# Patient Record
Sex: FEMALE | Race: WHITE | NOT HISPANIC OR LATINO | URBAN - METROPOLITAN AREA
[De-identification: names, ages, dates, MRNs, and addresses within clinical notes are randomized per-mention and may not be internally consistent; named-entity substitution may affect disease eponyms.]

---

## 2023-08-21 ENCOUNTER — HOSPITAL ENCOUNTER (OUTPATIENT)
Dept: DATA CONVERSION | Facility: HOSPITAL | Age: 63
End: 2023-08-21

## 2023-08-21 DIAGNOSIS — Z12.31 ENCOUNTER FOR SCREENING MAMMOGRAM FOR MALIGNANT NEOPLASM OF BREAST: ICD-10-CM

## 2023-09-11 PROBLEM — G47.00 INSOMNIA: Status: ACTIVE | Noted: 2023-09-11

## 2023-09-11 PROBLEM — M81.0 POST-MENOPAUSAL OSTEOPOROSIS: Status: ACTIVE | Noted: 2023-09-11

## 2023-09-11 PROBLEM — E55.9 VITAMIN D DEFICIENCY: Status: ACTIVE | Noted: 2023-09-11

## 2023-09-11 PROBLEM — R92.8 ABNORMAL MAMMOGRAM: Status: ACTIVE | Noted: 2023-09-11

## 2023-09-11 RX ORDER — ASCORBIC ACID 500 MG
TABLET ORAL
COMMUNITY

## 2023-12-21 ENCOUNTER — ANCILLARY PROCEDURE (OUTPATIENT)
Dept: RADIOLOGY | Facility: CLINIC | Age: 63
End: 2023-12-21
Payer: COMMERCIAL

## 2023-12-21 VITALS — WEIGHT: 170 LBS | HEIGHT: 66 IN | BODY MASS INDEX: 27.32 KG/M2

## 2023-12-21 DIAGNOSIS — Z12.31 ENCOUNTER FOR SCREENING MAMMOGRAM FOR MALIGNANT NEOPLASM OF BREAST: ICD-10-CM

## 2023-12-21 PROCEDURE — 77067 SCR MAMMO BI INCL CAD: CPT

## 2024-05-31 ENCOUNTER — APPOINTMENT (OUTPATIENT)
Dept: PRIMARY CARE | Facility: CLINIC | Age: 64
End: 2024-05-31
Payer: COMMERCIAL

## 2024-06-05 ENCOUNTER — OFFICE VISIT (OUTPATIENT)
Dept: PRIMARY CARE | Facility: CLINIC | Age: 64
End: 2024-06-05
Payer: COMMERCIAL

## 2024-06-05 ENCOUNTER — LAB (OUTPATIENT)
Dept: LAB | Facility: LAB | Age: 64
End: 2024-06-05
Payer: COMMERCIAL

## 2024-06-05 VITALS
BODY MASS INDEX: 27.6 KG/M2 | DIASTOLIC BLOOD PRESSURE: 64 MMHG | WEIGHT: 171 LBS | HEART RATE: 65 BPM | OXYGEN SATURATION: 99 % | TEMPERATURE: 97.5 F | SYSTOLIC BLOOD PRESSURE: 106 MMHG

## 2024-06-05 DIAGNOSIS — E55.9 VITAMIN D DEFICIENCY: ICD-10-CM

## 2024-06-05 DIAGNOSIS — Z13.228 SCREENING FOR METABOLIC DISORDER: ICD-10-CM

## 2024-06-05 DIAGNOSIS — D17.1 LIPOMA OF BACK: ICD-10-CM

## 2024-06-05 DIAGNOSIS — E87.5 HYPERKALEMIA: ICD-10-CM

## 2024-06-05 DIAGNOSIS — M81.0 POST-MENOPAUSAL OSTEOPOROSIS: ICD-10-CM

## 2024-06-05 DIAGNOSIS — Z13.6 SCREENING FOR CARDIOVASCULAR CONDITION: ICD-10-CM

## 2024-06-05 DIAGNOSIS — Z00.00 ANNUAL PHYSICAL EXAM: Primary | ICD-10-CM

## 2024-06-05 DIAGNOSIS — Z00.00 ANNUAL PHYSICAL EXAM: ICD-10-CM

## 2024-06-05 LAB
25(OH)D3 SERPL-MCNC: 34 NG/ML (ref 31–100)
ALBUMIN SERPL-MCNC: 4.4 G/DL (ref 3.5–5)
ALP BLD-CCNC: 97 U/L (ref 35–125)
ALT SERPL-CCNC: 10 U/L (ref 5–40)
ANION GAP SERPL CALC-SCNC: 12 MMOL/L
AST SERPL-CCNC: 16 U/L (ref 5–40)
BASOPHILS # BLD AUTO: 0.06 X10*3/UL (ref 0–0.1)
BASOPHILS NFR BLD AUTO: 1 %
BILIRUB SERPL-MCNC: 0.4 MG/DL (ref 0.1–1.2)
BUN SERPL-MCNC: 15 MG/DL (ref 8–25)
CALCIUM SERPL-MCNC: 9.9 MG/DL (ref 8.5–10.4)
CHLORIDE SERPL-SCNC: 107 MMOL/L (ref 97–107)
CHOLEST SERPL-MCNC: 207 MG/DL (ref 133–200)
CHOLEST/HDLC SERPL: 3.1 {RATIO}
CO2 SERPL-SCNC: 24 MMOL/L (ref 24–31)
CREAT SERPL-MCNC: 0.7 MG/DL (ref 0.4–1.6)
EGFRCR SERPLBLD CKD-EPI 2021: >90 ML/MIN/1.73M*2
EOSINOPHIL # BLD AUTO: 0.08 X10*3/UL (ref 0–0.7)
EOSINOPHIL NFR BLD AUTO: 1.4 %
ERYTHROCYTE [DISTWIDTH] IN BLOOD BY AUTOMATED COUNT: 14.5 % (ref 11.5–14.5)
GLUCOSE SERPL-MCNC: 84 MG/DL (ref 65–99)
HCT VFR BLD AUTO: 40.2 % (ref 36–46)
HDLC SERPL-MCNC: 67 MG/DL
HGB BLD-MCNC: 12.2 G/DL (ref 12–16)
IMM GRANULOCYTES # BLD AUTO: 0.04 X10*3/UL (ref 0–0.7)
IMM GRANULOCYTES NFR BLD AUTO: 0.7 % (ref 0–0.9)
LDLC SERPL CALC-MCNC: 129 MG/DL (ref 65–130)
LYMPHOCYTES # BLD AUTO: 1.9 X10*3/UL (ref 1.2–4.8)
LYMPHOCYTES NFR BLD AUTO: 32.5 %
MCH RBC QN AUTO: 27.1 PG (ref 26–34)
MCHC RBC AUTO-ENTMCNC: 30.3 G/DL (ref 32–36)
MCV RBC AUTO: 89 FL (ref 80–100)
MONOCYTES # BLD AUTO: 0.44 X10*3/UL (ref 0.1–1)
MONOCYTES NFR BLD AUTO: 7.5 %
NEUTROPHILS # BLD AUTO: 3.32 X10*3/UL (ref 1.2–7.7)
NEUTROPHILS NFR BLD AUTO: 56.9 %
NRBC BLD-RTO: 0 /100 WBCS (ref 0–0)
PLATELET # BLD AUTO: 405 X10*3/UL (ref 150–450)
POTASSIUM SERPL-SCNC: 5.2 MMOL/L (ref 3.4–5.1)
PROT SERPL-MCNC: 7.2 G/DL (ref 5.9–7.9)
RBC # BLD AUTO: 4.51 X10*6/UL (ref 4–5.2)
SODIUM SERPL-SCNC: 143 MMOL/L (ref 133–145)
TRIGL SERPL-MCNC: 55 MG/DL (ref 40–150)
TSH SERPL DL<=0.05 MIU/L-ACNC: 1.11 MIU/L (ref 0.27–4.2)
WBC # BLD AUTO: 5.8 X10*3/UL (ref 4.4–11.3)

## 2024-06-05 PROCEDURE — 85025 COMPLETE CBC W/AUTO DIFF WBC: CPT

## 2024-06-05 PROCEDURE — 82306 VITAMIN D 25 HYDROXY: CPT

## 2024-06-05 PROCEDURE — 1036F TOBACCO NON-USER: CPT | Performed by: INTERNAL MEDICINE

## 2024-06-05 PROCEDURE — 84443 ASSAY THYROID STIM HORMONE: CPT

## 2024-06-05 PROCEDURE — 99396 PREV VISIT EST AGE 40-64: CPT | Performed by: INTERNAL MEDICINE

## 2024-06-05 PROCEDURE — 36415 COLL VENOUS BLD VENIPUNCTURE: CPT

## 2024-06-05 PROCEDURE — 80053 COMPREHEN METABOLIC PANEL: CPT

## 2024-06-05 PROCEDURE — 80061 LIPID PANEL: CPT

## 2024-06-05 ASSESSMENT — ENCOUNTER SYMPTOMS
VOMITING: 0
CHILLS: 0
BACK PAIN: 0
WHEEZING: 0
NAUSEA: 0
UNEXPECTED WEIGHT CHANGE: 0
FREQUENCY: 0
ABDOMINAL PAIN: 0
TREMORS: 0
SINUS PRESSURE: 0
POLYPHAGIA: 0
COUGH: 0
NUMBNESS: 0
LOSS OF SENSATION IN FEET: 0
PALPITATIONS: 0
FATIGUE: 0
POLYDIPSIA: 0
DEPRESSION: 0
MYALGIAS: 0
TROUBLE SWALLOWING: 0
BLOOD IN STOOL: 0
OCCASIONAL FEELINGS OF UNSTEADINESS: 0
DYSURIA: 0
SHORTNESS OF BREATH: 0
SEIZURES: 0

## 2024-06-05 ASSESSMENT — PATIENT HEALTH QUESTIONNAIRE - PHQ9
2. FEELING DOWN, DEPRESSED OR HOPELESS: NOT AT ALL
1. LITTLE INTEREST OR PLEASURE IN DOING THINGS: NOT AT ALL
SUM OF ALL RESPONSES TO PHQ9 QUESTIONS 1 AND 2: 0

## 2024-06-05 ASSESSMENT — PAIN SCALES - GENERAL: PAINLEVEL: 0-NO PAIN

## 2024-06-05 NOTE — PROGRESS NOTES
Subjective   Patient ID: Bradley Mireles is a 64 y.o. female who presents for Annual Exam.    HPI     Exercises 2-3 times a week          Lipoma on back stable         Refused shingles vaccine.  No complaints today    Review of Systems   Constitutional:  Negative for chills, fatigue and unexpected weight change.   HENT:  Negative for postnasal drip, sinus pressure and trouble swallowing.    Respiratory:  Negative for cough, shortness of breath and wheezing.    Cardiovascular:  Negative for chest pain, palpitations and leg swelling.   Gastrointestinal:  Negative for abdominal pain, blood in stool, nausea and vomiting.   Endocrine: Negative for polydipsia, polyphagia and polyuria.   Genitourinary:  Negative for dysuria and frequency.   Musculoskeletal:  Negative for back pain and myalgias.   Skin:  Negative for rash.   Neurological:  Negative for tremors, seizures and numbness.   Psychiatric/Behavioral:  Negative for behavioral problems.        Objective   /64 (BP Location: Left arm, Patient Position: Sitting, BP Cuff Size: Adult)   Pulse 65   Temp 36.4 °C (97.5 °F) (Temporal)   Wt 77.6 kg (171 lb)   SpO2 99%   BMI 27.60 kg/m²     Physical Exam  Constitutional:       General: She is not in acute distress.     Appearance: Normal appearance.   HENT:      Head: Normocephalic and atraumatic.      Right Ear: Tympanic membrane normal.      Left Ear: Tympanic membrane normal.   Eyes:      Extraocular Movements: Extraocular movements intact.      Pupils: Pupils are equal, round, and reactive to light.   Cardiovascular:      Rate and Rhythm: Normal rate and regular rhythm.      Heart sounds: Normal heart sounds. No murmur heard.     No friction rub.   Pulmonary:      Effort: Pulmonary effort is normal.      Breath sounds: Normal breath sounds. No wheezing or rales.   Abdominal:      General: Bowel sounds are normal.      Palpations: Abdomen is soft.      Tenderness: There is no abdominal tenderness. There is no  guarding.   Musculoskeletal:         General: Normal range of motion.      Cervical back: Normal range of motion and neck supple.      Right lower leg: No edema.      Left lower leg: No edema.   Skin:     General: Skin is warm and dry.      Findings: No rash.      Comments: Upper mid back soft swelling, likely lipoma approximately 6 to 8 cm   Neurological:      General: No focal deficit present.      Mental Status: She is alert and oriented to person, place, and time.      Cranial Nerves: No cranial nerve deficit.      Gait: Gait normal.   Psychiatric:         Mood and Affect: Mood normal.         Assessment/Plan        Bradley was seen today for annual exam.  Diagnoses and all orders for this visit:  Annual physical exam (Primary)  -     CBC and Auto Differential; Future  -     Comprehensive Metabolic Panel; Future  Vitamin D deficiency  -     Vitamin D 25-Hydroxy,Total (for eval of Vitamin D levels); Future  Screening for cardiovascular condition  -     Lipid Panel; Future  Screening for metabolic disorder  -     TSH with reflex to Free T4 if abnormal; Future  Post-menopausal osteoporosis  -     XR DEXA bone density; Future  Lipoma of back  -     Referral to General Surgery; Future       Current Outpatient Medications   Medication Instructions    ascorbic acid (Vitamin C) 500 mg tablet oral    calcium carbonate (CALCIUM 600 ORAL) oral    ergocalciferol, vitamin D2, (VITAMIN D2 ORAL) oral

## 2024-07-24 ENCOUNTER — LAB (OUTPATIENT)
Dept: LAB | Facility: LAB | Age: 64
End: 2024-07-24
Payer: COMMERCIAL

## 2024-07-24 DIAGNOSIS — E87.5 HYPERKALEMIA: ICD-10-CM

## 2024-07-24 LAB
ANION GAP SERPL CALC-SCNC: 11 MMOL/L
BUN SERPL-MCNC: 15 MG/DL (ref 8–25)
CALCIUM SERPL-MCNC: 10 MG/DL (ref 8.5–10.4)
CHLORIDE SERPL-SCNC: 109 MMOL/L (ref 97–107)
CO2 SERPL-SCNC: 24 MMOL/L (ref 24–31)
CREAT SERPL-MCNC: 0.7 MG/DL (ref 0.4–1.6)
EGFRCR SERPLBLD CKD-EPI 2021: >90 ML/MIN/1.73M*2
GLUCOSE SERPL-MCNC: 90 MG/DL (ref 65–99)
POTASSIUM SERPL-SCNC: 4.7 MMOL/L (ref 3.4–5.1)
SODIUM SERPL-SCNC: 144 MMOL/L (ref 133–145)

## 2024-07-24 PROCEDURE — 36415 COLL VENOUS BLD VENIPUNCTURE: CPT

## 2024-07-24 PROCEDURE — 80048 BASIC METABOLIC PNL TOTAL CA: CPT

## 2024-08-17 ASSESSMENT — ENCOUNTER SYMPTOMS
ADENOPATHY: 0
JOINT SWELLING: 0
ACTIVITY CHANGE: 0
DYSURIA: 0
ABDOMINAL PAIN: 0
SHORTNESS OF BREATH: 0
COLOR CHANGE: 0
DIFFICULTY URINATING: 0
HEADACHES: 0
UNEXPECTED WEIGHT CHANGE: 0
DIZZINESS: 0
WEAKNESS: 0
CHEST TIGHTNESS: 0
ABDOMINAL DISTENTION: 0
FATIGUE: 0

## 2024-08-17 NOTE — PROGRESS NOTES
Annual-menopause  Subjective   Bradley Mireles is a 64 y.o. former pt Dr. Raphael/last exam 2023 wc, who is here for a routine exam.   Complaints: fewer  vaso sxs, milder and less intense; bothered by abdominal adiposity/wt gain since menopause. Denies urogyn sxs.           Denies vag bleed or dsicharge; denies pelvic pain, pressure, or bloating  PMHx: dense breast tissue; Osteopenia hip.  Surg Hx: cone biopsy         mole removed R shoulder - abn cells         COLONOSCOPY- NEG 2018; rpt due   Father:  80 yrs, htn, smoker/COPD  Mother: alive 76 yrs, Lyme disease, MI, pace maker  Last pap 2021 NEG/HPV NEG; Mamm 2023-benign ; Abn Mamm 02/15/2016- LT f/u cyst.   DEXA  2020 =spine 0.0/hip-1.8.   Colposcopy 2008 BRAD 3/CIS--resolved by cone bx.   Last Period 2016/age 56. STDs HSV ; not currently sexually active   Total preg 4.  live births 2= VAVDs.    Review of Systems   Constitutional:  Negative for activity change, fatigue and unexpected weight change.   Respiratory:  Negative for chest tightness and shortness of breath.    Cardiovascular:  Negative for chest pain and leg swelling.   Gastrointestinal:  Negative for abdominal distention and abdominal pain.   Genitourinary:  Negative for difficulty urinating, dysuria, genital sores, pelvic pain, vaginal bleeding, vaginal discharge and vaginal pain.   Musculoskeletal:  Negative for gait problem and joint swelling.   Skin:  Negative for color change and rash.   Neurological:  Negative for dizziness, weakness and headaches.   Hematological:  Negative for adenopathy.   Objective Visit Vitals  /84   Wt 77.7 kg (171 lb 6.4 oz)   BMI 27.66 kg/m²   OB Status Postmenopausal   Smoking Status Never   BSA 1.9 m²       General:   Alert and oriented, in no acute distress   Neck: Supple. No visible thyromegaly.    Breast/Axilla: Normal to palpation bilaterally without masses, skin changes, or nipple discharge.    Abdomen: Soft,  non-tender, without masses or organomegaly   Vulva: Normal architecture without erythema, masses, or lesions.    Vagina: mucosa without lesions, masses.  Positive atrophy. No abnormal vaginal discharge.    Cervix: Normal without masses, lesions, or signs of cervicitis.    Uterus: Normal mobile, non-enlarged uterus    Adnexa: No palpable masses or tenderness   Pelvic Floor No POP noted. No high tone pelvic floor    Psych  Rectal Normal affect. Normal mood.      Assessment/Plan   Encounter Diagnoses   Name Primary?    Visit for gynecologic examination; grossly nl breast/gyn exams. Yes    Screening for human papillomavirus; pap sent     History of cervical dysplasia;needs surveillance til 20 yrs past tx CIS=2028.     Encounter for screening mammogram for malignant neoplasm of breast; due 12/2024     Menopause; sxs abating.     Postmenopausal atrophic vaginitis; minimal sxs.     Postmenopausal bone loss; dexa due; has order from pcp.     Screen for colon cancer; 2018 neg; rpt due 2028.      Lexi Urbina MD

## 2024-08-19 ENCOUNTER — OFFICE VISIT (OUTPATIENT)
Dept: OBSTETRICS AND GYNECOLOGY | Facility: CLINIC | Age: 64
End: 2024-08-19
Payer: COMMERCIAL

## 2024-08-19 VITALS — BODY MASS INDEX: 27.66 KG/M2 | WEIGHT: 171.4 LBS | DIASTOLIC BLOOD PRESSURE: 84 MMHG | SYSTOLIC BLOOD PRESSURE: 138 MMHG

## 2024-08-19 DIAGNOSIS — Z12.31 ENCOUNTER FOR SCREENING MAMMOGRAM FOR MALIGNANT NEOPLASM OF BREAST: ICD-10-CM

## 2024-08-19 DIAGNOSIS — Z87.410 HISTORY OF CERVICAL DYSPLASIA: ICD-10-CM

## 2024-08-19 DIAGNOSIS — Z11.51 SCREENING FOR HUMAN PAPILLOMAVIRUS: ICD-10-CM

## 2024-08-19 DIAGNOSIS — M81.0 POSTMENOPAUSAL BONE LOSS: ICD-10-CM

## 2024-08-19 DIAGNOSIS — Z78.0 MENOPAUSE: ICD-10-CM

## 2024-08-19 DIAGNOSIS — N95.2 POSTMENOPAUSAL ATROPHIC VAGINITIS: ICD-10-CM

## 2024-08-19 DIAGNOSIS — Z12.11 SCREEN FOR COLON CANCER: ICD-10-CM

## 2024-08-19 DIAGNOSIS — Z01.419 VISIT FOR GYNECOLOGIC EXAMINATION: Primary | ICD-10-CM

## 2024-08-19 PROCEDURE — 99396 PREV VISIT EST AGE 40-64: CPT | Performed by: OBSTETRICS & GYNECOLOGY

## 2024-08-19 ASSESSMENT — ENCOUNTER SYMPTOMS
DEPRESSION: 0
LOSS OF SENSATION IN FEET: 0
OCCASIONAL FEELINGS OF UNSTEADINESS: 0

## 2024-08-19 ASSESSMENT — PATIENT HEALTH QUESTIONNAIRE - PHQ9
SUM OF ALL RESPONSES TO PHQ9 QUESTIONS 1 & 2: 0
2. FEELING DOWN, DEPRESSED OR HOPELESS: NOT AT ALL
1. LITTLE INTEREST OR PLEASURE IN DOING THINGS: NOT AT ALL

## 2024-08-19 ASSESSMENT — PAIN SCALES - GENERAL: PAINLEVEL: 0-NO PAIN

## 2024-12-31 ENCOUNTER — HOSPITAL ENCOUNTER (OUTPATIENT)
Dept: RADIOLOGY | Facility: CLINIC | Age: 64
Discharge: HOME | End: 2024-12-31
Payer: COMMERCIAL

## 2024-12-31 VITALS — HEIGHT: 66 IN | WEIGHT: 170 LBS | BODY MASS INDEX: 27.32 KG/M2

## 2024-12-31 DIAGNOSIS — Z12.31 ENCOUNTER FOR SCREENING MAMMOGRAM FOR MALIGNANT NEOPLASM OF BREAST: ICD-10-CM

## 2024-12-31 DIAGNOSIS — M81.0 POST-MENOPAUSAL OSTEOPOROSIS: ICD-10-CM

## 2024-12-31 PROCEDURE — 77063 BREAST TOMOSYNTHESIS BI: CPT | Performed by: RADIOLOGY

## 2024-12-31 PROCEDURE — 77067 SCR MAMMO BI INCL CAD: CPT | Performed by: RADIOLOGY

## 2024-12-31 PROCEDURE — 77067 SCR MAMMO BI INCL CAD: CPT

## 2025-01-08 ENCOUNTER — HOSPITAL ENCOUNTER (OUTPATIENT)
Dept: RADIOLOGY | Facility: CLINIC | Age: 65
Discharge: HOME | End: 2025-01-08
Payer: COMMERCIAL

## 2025-01-08 PROCEDURE — 77080 DXA BONE DENSITY AXIAL: CPT

## 2025-01-08 PROCEDURE — 77080 DXA BONE DENSITY AXIAL: CPT | Performed by: RADIOLOGY

## 2025-06-10 ENCOUNTER — HOSPITAL ENCOUNTER (OUTPATIENT)
Dept: RADIOLOGY | Facility: CLINIC | Age: 65
Discharge: HOME | End: 2025-06-10
Payer: COMMERCIAL

## 2025-06-10 ENCOUNTER — OFFICE VISIT (OUTPATIENT)
Dept: PRIMARY CARE | Facility: CLINIC | Age: 65
End: 2025-06-10
Payer: COMMERCIAL

## 2025-06-10 VITALS
OXYGEN SATURATION: 98 % | BODY MASS INDEX: 27.76 KG/M2 | HEART RATE: 62 BPM | SYSTOLIC BLOOD PRESSURE: 108 MMHG | DIASTOLIC BLOOD PRESSURE: 68 MMHG | TEMPERATURE: 96.6 F | WEIGHT: 172 LBS

## 2025-06-10 DIAGNOSIS — Z13.228 SCREENING FOR METABOLIC DISORDER: ICD-10-CM

## 2025-06-10 DIAGNOSIS — Z00.00 ANNUAL PHYSICAL EXAM: Primary | ICD-10-CM

## 2025-06-10 DIAGNOSIS — E04.1 THYROID NODULE: ICD-10-CM

## 2025-06-10 DIAGNOSIS — Z13.6 SCREENING FOR CARDIOVASCULAR CONDITION: ICD-10-CM

## 2025-06-10 DIAGNOSIS — E55.9 VITAMIN D DEFICIENCY: ICD-10-CM

## 2025-06-10 PROCEDURE — 99213 OFFICE O/P EST LOW 20 MIN: CPT | Performed by: INTERNAL MEDICINE

## 2025-06-10 PROCEDURE — 90750 HZV VACC RECOMBINANT IM: CPT | Performed by: INTERNAL MEDICINE

## 2025-06-10 PROCEDURE — 76536 US EXAM OF HEAD AND NECK: CPT | Performed by: STUDENT IN AN ORGANIZED HEALTH CARE EDUCATION/TRAINING PROGRAM

## 2025-06-10 PROCEDURE — 90471 IMMUNIZATION ADMIN: CPT | Performed by: INTERNAL MEDICINE

## 2025-06-10 PROCEDURE — 1036F TOBACCO NON-USER: CPT | Performed by: INTERNAL MEDICINE

## 2025-06-10 PROCEDURE — 99396 PREV VISIT EST AGE 40-64: CPT | Performed by: INTERNAL MEDICINE

## 2025-06-10 PROCEDURE — 76536 US EXAM OF HEAD AND NECK: CPT

## 2025-06-10 ASSESSMENT — ENCOUNTER SYMPTOMS
FREQUENCY: 0
POLYDIPSIA: 0
UNEXPECTED WEIGHT CHANGE: 0
OCCASIONAL FEELINGS OF UNSTEADINESS: 0
DEPRESSION: 0
ABDOMINAL PAIN: 0
SHORTNESS OF BREATH: 0
FATIGUE: 0
PALPITATIONS: 0
SEIZURES: 0
TREMORS: 0
CHILLS: 0
LOSS OF SENSATION IN FEET: 0
COUGH: 0
SINUS PRESSURE: 0
WHEEZING: 0
MYALGIAS: 0
NAUSEA: 0
TROUBLE SWALLOWING: 0
DYSURIA: 0
NUMBNESS: 0
POLYPHAGIA: 0
BLOOD IN STOOL: 0
VOMITING: 0
BACK PAIN: 0

## 2025-06-10 ASSESSMENT — PAIN SCALES - GENERAL: PAINLEVEL_OUTOF10: 3

## 2025-06-10 ASSESSMENT — PATIENT HEALTH QUESTIONNAIRE - PHQ9
SUM OF ALL RESPONSES TO PHQ9 QUESTIONS 1 AND 2: 0
1. LITTLE INTEREST OR PLEASURE IN DOING THINGS: NOT AT ALL
2. FEELING DOWN, DEPRESSED OR HOPELESS: NOT AT ALL

## 2025-06-10 NOTE — PROGRESS NOTES
Subjective   Patient ID: Bradley Mireles is a 65 y.o. female who presents for Annual Exam.    HPI     Exercises 2-3 times a week          Lipoma on back stable         Right foot plantar fascitis, using boot at night and strectches, refuswed referral to podiatry    Left sided thyroid nodule    Review of Systems   Constitutional:  Negative for chills, fatigue and unexpected weight change.   HENT:  Negative for postnasal drip, sinus pressure and trouble swallowing.    Respiratory:  Negative for cough, shortness of breath and wheezing.    Cardiovascular:  Negative for chest pain, palpitations and leg swelling.   Gastrointestinal:  Negative for abdominal pain, blood in stool, nausea and vomiting.   Endocrine: Negative for polydipsia, polyphagia and polyuria.   Genitourinary:  Negative for dysuria and frequency.   Musculoskeletal:  Negative for back pain and myalgias.   Skin:  Negative for rash.   Neurological:  Negative for tremors, seizures and numbness.   Psychiatric/Behavioral:  Negative for behavioral problems.        Objective   /68 (BP Location: Left arm, Patient Position: Sitting, BP Cuff Size: Adult)   Pulse 62   Temp 35.9 °C (96.6 °F) (Temporal)   Wt 78 kg (172 lb)   SpO2 98%   BMI 27.76 kg/m²     Physical Exam  Constitutional:       General: She is not in acute distress.     Appearance: Normal appearance.   HENT:      Head: Normocephalic and atraumatic.      Right Ear: Tympanic membrane normal.      Left Ear: Tympanic membrane normal.   Eyes:      Extraocular Movements: Extraocular movements intact.      Pupils: Pupils are equal, round, and reactive to light.   Neck:      Comments: Left side of neck nodule approximately 2 cm  Cardiovascular:      Rate and Rhythm: Normal rate and regular rhythm.      Heart sounds: Normal heart sounds. No murmur heard.     No friction rub.   Pulmonary:      Effort: Pulmonary effort is normal.      Breath sounds: Normal breath sounds. No wheezing or rales.    Abdominal:      General: Bowel sounds are normal.      Palpations: Abdomen is soft.      Tenderness: There is no abdominal tenderness. There is no guarding.   Musculoskeletal:         General: Normal range of motion.      Cervical back: Normal range of motion.      Right lower leg: No edema.      Left lower leg: No edema.   Skin:     General: Skin is warm and dry.      Findings: No rash.      Comments: Upper mid back soft swelling, likely lipoma approximately 6 to 8 cm   Neurological:      General: No focal deficit present.      Mental Status: She is alert and oriented to person, place, and time.      Cranial Nerves: No cranial nerve deficit.      Gait: Gait normal.   Psychiatric:         Mood and Affect: Mood normal.         Assessment/Plan        Bradley was seen today for annual exam.  Diagnoses and all orders for this visit:  Annual physical exam (Primary)  -     CBC and Auto Differential; Future  -     Comprehensive Metabolic Panel; Future  -     CBC and Auto Differential  -     Comprehensive Metabolic Panel  Screening for cardiovascular condition  -     Lipid Panel; Future  -     Lipid Panel  Screening for metabolic disorder  -     TSH with reflex to Free T4 if abnormal; Future  -     Hemoglobin A1C; Future  -     TSH with reflex to Free T4 if abnormal  -     Hemoglobin A1C  Vitamin D deficiency  -     Vitamin D 25-Hydroxy,Total (for eval of Vitamin D levels); Future  -     Vitamin D 25-Hydroxy,Total (for eval of Vitamin D levels)  Thyroid nodule  -     US thyroid; Future  Other orders  -     Zoster vaccine, recombinant, adult (SHINGRIX)       Concerned about her weight, and gaining weight near her midsection  Does not exercise, advised moderate intensity exercise 150 minutes/week recommended low-carb diet  Right foot plantar fasciitis, stated she wears a boot at night and does stretching exercises, advised follow-up with podiatry, which she refused today    Lab Results   Component Value Date    WBC 5.8  "06/05/2024    HGB 12.2 06/05/2024    HCT 40.2 06/05/2024    MCV 89 06/05/2024     06/05/2024     Lab Results   Component Value Date    GLUCOSE 90 07/24/2024    CALCIUM 10.0 07/24/2024     07/24/2024    K 4.7 07/24/2024    CO2 24 07/24/2024     (H) 07/24/2024    BUN 15 07/24/2024    CREATININE 0.70 07/24/2024     Lab Results   Component Value Date    CHOL 207 (H) 06/05/2024    CHOL 175 05/30/2023    CHOL 180 04/18/2022     Lab Results   Component Value Date    HDL 67.0 06/05/2024    HDL 65 05/30/2023    HDL 68 04/18/2022     Lab Results   Component Value Date    LDLCALC 129 06/05/2024    LDLCALC 98 05/30/2023    LDLCALC 103 04/18/2022     Lab Results   Component Value Date    TRIG 55 06/05/2024    TRIG 60 05/30/2023    TRIG 45 04/18/2022     No components found for: \"CHOLHDL\"      Current Outpatient Medications   Medication Instructions    ascorbic acid (Vitamin C) 500 mg tablet Take by mouth.    calcium carbonate (CALCIUM 600 ORAL) Take by mouth.    ergocalciferol, vitamin D2, (VITAMIN D2 ORAL) Take by mouth.      "

## 2025-06-11 LAB
25(OH)D3+25(OH)D2 SERPL-MCNC: 35 NG/ML (ref 30–100)
ALBUMIN SERPL-MCNC: 4.4 G/DL (ref 3.6–5.1)
ALP SERPL-CCNC: 94 U/L (ref 37–153)
ALT SERPL-CCNC: 8 U/L (ref 6–29)
ANION GAP SERPL CALCULATED.4IONS-SCNC: 9 MMOL/L (CALC) (ref 7–17)
AST SERPL-CCNC: 14 U/L (ref 10–35)
BASOPHILS # BLD AUTO: 62 CELLS/UL (ref 0–200)
BASOPHILS NFR BLD AUTO: 1.2 %
BILIRUB SERPL-MCNC: 0.5 MG/DL (ref 0.2–1.2)
BUN SERPL-MCNC: 16 MG/DL (ref 7–25)
CALCIUM SERPL-MCNC: 10 MG/DL (ref 8.6–10.4)
CHLORIDE SERPL-SCNC: 105 MMOL/L (ref 98–110)
CHOLEST SERPL-MCNC: 196 MG/DL
CHOLEST/HDLC SERPL: 2.7 (CALC)
CO2 SERPL-SCNC: 25 MMOL/L (ref 20–32)
CREAT SERPL-MCNC: 0.61 MG/DL (ref 0.5–1.05)
EGFRCR SERPLBLD CKD-EPI 2021: 100 ML/MIN/1.73M2
EOSINOPHIL # BLD AUTO: 52 CELLS/UL (ref 15–500)
EOSINOPHIL NFR BLD AUTO: 1 %
ERYTHROCYTE [DISTWIDTH] IN BLOOD BY AUTOMATED COUNT: 13.3 % (ref 11–15)
EST. AVERAGE GLUCOSE BLD GHB EST-MCNC: 105 MG/DL
EST. AVERAGE GLUCOSE BLD GHB EST-SCNC: 5.8 MMOL/L
GLUCOSE SERPL-MCNC: 89 MG/DL (ref 65–99)
HBA1C MFR BLD: 5.3 %
HCT VFR BLD AUTO: 39.2 % (ref 35–45)
HDLC SERPL-MCNC: 72 MG/DL
HGB BLD-MCNC: 12.1 G/DL (ref 11.7–15.5)
LDLC SERPL CALC-MCNC: 110 MG/DL (CALC)
LYMPHOCYTES # BLD AUTO: 1451 CELLS/UL (ref 850–3900)
LYMPHOCYTES NFR BLD AUTO: 27.9 %
MCH RBC QN AUTO: 26.9 PG (ref 27–33)
MCHC RBC AUTO-ENTMCNC: 30.9 G/DL (ref 32–36)
MCV RBC AUTO: 87.3 FL (ref 80–100)
MONOCYTES # BLD AUTO: 380 CELLS/UL (ref 200–950)
MONOCYTES NFR BLD AUTO: 7.3 %
NEUTROPHILS # BLD AUTO: 3255 CELLS/UL (ref 1500–7800)
NEUTROPHILS NFR BLD AUTO: 62.6 %
NONHDLC SERPL-MCNC: 124 MG/DL (CALC)
PLATELET # BLD AUTO: 320 THOUSAND/UL (ref 140–400)
PMV BLD REES-ECKER: 11 FL (ref 7.5–12.5)
POTASSIUM SERPL-SCNC: 4.7 MMOL/L (ref 3.5–5.3)
PROT SERPL-MCNC: 7 G/DL (ref 6.1–8.1)
RBC # BLD AUTO: 4.49 MILLION/UL (ref 3.8–5.1)
SODIUM SERPL-SCNC: 139 MMOL/L (ref 135–146)
TRIGL SERPL-MCNC: 54 MG/DL
TSH SERPL-ACNC: 1.21 MIU/L (ref 0.4–4.5)
WBC # BLD AUTO: 5.2 THOUSAND/UL (ref 3.8–10.8)

## 2025-06-12 ENCOUNTER — TELEPHONE (OUTPATIENT)
Dept: PRIMARY CARE | Facility: CLINIC | Age: 65
End: 2025-06-12
Payer: COMMERCIAL

## 2025-06-12 DIAGNOSIS — E04.1 THYROID NODULE: ICD-10-CM

## 2025-06-12 NOTE — TELEPHONE ENCOUNTER
----- Message from Traci Burns sent at 6/12/2025 10:01 AM EDT -----  Ultrasound thyroid showed multiple thyroid nodules.  Recommend follow-up with ENT.  Please give referral  ----- Message -----  From: Adarsh, Radiology Results In  Sent: 6/11/2025  11:32 PM EDT  To: Traci Burns MD

## 2025-07-11 ENCOUNTER — OFFICE VISIT (OUTPATIENT)
Dept: OTOLARYNGOLOGY | Facility: HOSPITAL | Age: 65
End: 2025-07-11
Payer: COMMERCIAL

## 2025-07-11 VITALS — BODY MASS INDEX: 28.08 KG/M2 | WEIGHT: 174 LBS | TEMPERATURE: 96.1 F

## 2025-07-11 DIAGNOSIS — E04.1 THYROID NODULE: ICD-10-CM

## 2025-07-11 PROCEDURE — 99214 OFFICE O/P EST MOD 30 MIN: CPT | Performed by: STUDENT IN AN ORGANIZED HEALTH CARE EDUCATION/TRAINING PROGRAM

## 2025-07-11 PROCEDURE — 99204 OFFICE O/P NEW MOD 45 MIN: CPT | Performed by: STUDENT IN AN ORGANIZED HEALTH CARE EDUCATION/TRAINING PROGRAM

## 2025-07-11 ASSESSMENT — PATIENT HEALTH QUESTIONNAIRE - PHQ9
2. FEELING DOWN, DEPRESSED OR HOPELESS: NOT AT ALL
SUM OF ALL RESPONSES TO PHQ9 QUESTIONS 1 AND 2: 0
1. LITTLE INTEREST OR PLEASURE IN DOING THINGS: NOT AT ALL

## 2025-07-11 NOTE — LETTER
July 12, 2025     Traci Burns MD  [address]  [facility]  [facility] [address]    Patient: Bradley Mireles   YOB: 1960   Date of Visit: 7/11/2025       Dear Dr. Traci Burns MD:    Thank you for referring Bradley Mireles to me for evaluation. Below are my notes for this consultation.  If you have questions, please do not hesitate to call me. I look forward to following your patient along with you.       Sincerely,     Linsey Angulo MD      CC: No Recipients  ______________________________________________________________________________________    HEAD AND NECK SURGERY CONSULT  [facility]    Referring Provider: Grant REAL  I had the pleasure of seeing Bradley Mireles as a consultation today for evaluation of multiple thyroid nodules.     The patient reports a history of vitamin D deficiency presenting with multiple bilateral thyroid nodules. Patient endorses no symptoms. Her PCP noticed her enlarged thyroid on exam and subsequently ordered an U/S for her. She denies dysphagia, dysphonia, dyspnea, weight loss, throat pain, and ear pain. Denies personal history of radiation to the neck, or history of cancer. Patient's mom was on synthroid, no cancer diagnosis per patient. No family history of thyroid cancer.     The patient denies having prior trauma or surgery to their head and neck. There is not a personal or family history of blood clots, easy bleeding or bruising, or anesthesia concerns. The patient's occupation is . They live at home alone,  passed a few years ago and has a son and daughter.      Tobacco use: The patient  reports that she has never smoked. She has never used smokeless tobacco.   Alcohol Use: The patient  reports current alcohol use.     Physical Examination  Vitals:  Temp 35.6 °C (96.1 °F)   Wt 78.9 kg (174 lb)   BMI 28.08 kg/m²   General: Examination reveals a well-developed, well-nourished patient  in no apparent distress.  The patient has no audible dysphonia, stridor or airway distress.  The patient is oriented, alert and responsive.    Oral Cavity:  The patient is able to open the mouth widely without trismus.  The floor of mouth and oral tongue are soft, and no mucosal abnormalities are noted on the lips or within the oral cavity.  The oral tongue is fully mobile and midline on protrusion.  The patient's teeth are good  Oropharynx: There are no mucosal abnormalities noted within the oropharynx.  The soft palate elevates symmetrically, the tonsils and base of tongue are normal to inspection and palpation.       Salivary glands: There are no palpable masses of the parotid or submandibular glands.   Neuro: Cranial nerves 2-12 are without obvious abnormality.  Neck: The neck is soft and symmetric.  There is no palpable adenopathy. Thyroid: Left thyroid lobe palpable nodule, soft, nontender     DATA REVIEWED:  Labs: I reveiwed recent thyroid labs including TSH that was normal  Lab Results   Component Value Date    WBC 5.2 06/10/2025    HGB 12.1 06/10/2025    HCT 39.2 06/10/2025     06/10/2025    NEUTROABS 3.32 06/05/2024     Lab Results   Component Value Date    TSH 1.21 06/10/2025    HGBA1C 5.3 06/10/2025        Radiology: I personally reviewed the thyroid U/S from 6/10/25 which demonstrated 4 nodules:  Nodule 1: 1.4 x 1.3 x 1.2 cm solid R upper pole (TI-RADS 3)  Nodule 2: 1.5 x 1.1. x 1.5 cm mixed cystic and solid R inferior pole (TI-RADS 2)  Nodule 3: 2.7 x 1.5 x 2.0 cm mixed L inferior pole (TI-RADS 2)  Nodule 4: 0.9 x 0.4 x 0.9 solid with punctate echogenic foci isthmus (TI-RADS 5)      Review of prior medical records: I reviewed the patient's medical records which included a clinic note from Dr. Burns dated 6/10/25 in which she detailed new thyroid nodule and work up with ultrasound and referral to ENT     Pathology: N/A       ASSESSMENT and PLAN:    Thyroid Nodule(s)  - Reviewed work up and  treatment options, as well as exam findings today. She is overall asymptomatic, although left nodule is palpable on exam. Her TSH is within normal limits  - Discussed that none of her nodules meet criteria for biopsy and would not recommend surgery given absence of symptoms. The majority of her nodules appear benign on ultrasound and do not warrant further follow up. Patient with a TI-RADS 5 isthmus nodule with greatest dimension 0.9cm, which meets criteria for follow up.   - Recommend follow up thyroid ultrasound in one year with PCP. Red flag symptoms discussed. I am happy to see her back if any new concerns develop      Questions answered and she is in agreement with the plan  Linsey Angulo MD

## 2025-07-11 NOTE — PROGRESS NOTES
HEAD AND NECK SURGERY CONSULT  [facility]    Referring Provider: Grant REAL  I had the pleasure of seeing Bradley Mireles as a consultation today for evaluation of multiple thyroid nodules.     The patient reports a history of vitamin D deficiency presenting with multiple bilateral thyroid nodules. Patient endorses no symptoms. Her PCP noticed her enlarged thyroid on exam and subsequently ordered an U/S for her. She denies dysphagia, dysphonia, dyspnea, weight loss, throat pain, and ear pain. Denies personal history of radiation to the neck, or history of cancer. Patient's mom was on synthroid, no cancer diagnosis per patient. No family history of thyroid cancer.     The patient denies having prior trauma or surgery to their head and neck. There is not a personal or family history of blood clots, easy bleeding or bruising, or anesthesia concerns. The patient's occupation is . They live at home alone,  passed a few years ago and has a son and daughter.      Tobacco use: The patient  reports that she has never smoked. She has never used smokeless tobacco.   Alcohol Use: The patient  reports current alcohol use.     Physical Examination  Vitals:  Temp 35.6 °C (96.1 °F)   Wt 78.9 kg (174 lb)   BMI 28.08 kg/m²   General: Examination reveals a well-developed, well-nourished patient in no apparent distress.  The patient has no audible dysphonia, stridor or airway distress.  The patient is oriented, alert and responsive.    Oral Cavity:  The patient is able to open the mouth widely without trismus.  The floor of mouth and oral tongue are soft, and no mucosal abnormalities are noted on the lips or within the oral cavity.  The oral tongue is fully mobile and midline on protrusion.  The patient's teeth are good  Oropharynx: There are no mucosal abnormalities noted within the oropharynx.  The soft palate elevates symmetrically, the tonsils and base of tongue are normal to inspection  and palpation.       Salivary glands: There are no palpable masses of the parotid or submandibular glands.   Neuro: Cranial nerves 2-12 are without obvious abnormality.  Neck: The neck is soft and symmetric.  There is no palpable adenopathy. Thyroid: Left thyroid lobe palpable nodule, soft, nontender     DATA REVIEWED:  Labs: I reveiwed recent thyroid labs including TSH that was normal  Lab Results   Component Value Date    WBC 5.2 06/10/2025    HGB 12.1 06/10/2025    HCT 39.2 06/10/2025     06/10/2025    NEUTROABS 3.32 06/05/2024     Lab Results   Component Value Date    TSH 1.21 06/10/2025    HGBA1C 5.3 06/10/2025        Radiology: I personally reviewed the thyroid U/S from 6/10/25 which demonstrated 4 nodules:  Nodule 1: 1.4 x 1.3 x 1.2 cm solid R upper pole (TI-RADS 3)  Nodule 2: 1.5 x 1.1. x 1.5 cm mixed cystic and solid R inferior pole (TI-RADS 2)  Nodule 3: 2.7 x 1.5 x 2.0 cm mixed L inferior pole (TI-RADS 2)  Nodule 4: 0.9 x 0.4 x 0.9 solid with punctate echogenic foci isthmus (TI-RADS 5)      Review of prior medical records: I reviewed the patient's medical records which included a clinic note from Dr. Burns dated 6/10/25 in which she detailed new thyroid nodule and work up with ultrasound and referral to ENT     Pathology: N/A       ASSESSMENT and PLAN:    Thyroid Nodule(s)  - Reviewed work up and treatment options, as well as exam findings today. She is overall asymptomatic, although left nodule is palpable on exam. Her TSH is within normal limits  - Discussed that none of her nodules meet criteria for biopsy and would not recommend surgery given absence of symptoms. The majority of her nodules appear benign on ultrasound and do not warrant further follow up. Patient with a TI-RADS 5 isthmus nodule with greatest dimension 0.9cm, which meets criteria for follow up.   - Recommend follow up thyroid ultrasound in one year with PCP. Red flag symptoms discussed. I am happy to see her back if any new  concerns develop      Questions answered and she is in agreement with the plan  Linsey Angulo MD